# Patient Record
(demographics unavailable — no encounter records)

---

## 2025-01-17 NOTE — ASSESSMENT
[Diabetes Foot Care] : diabetes foot care [Long Term Vascular Complications] : long term vascular complications of diabetes [Carbohydrate Consistent Diet] : carbohydrate consistent diet [Importance of Diet and Exercise] : importance of diet and exercise to improve glycemic control, achieve weight loss and improve cardiovascular health [Retinopathy Screening] : Patient was referred to ophthalmology for retinopathy screening [FreeTextEntry1] : STABLE FROM DIABETES, AND HYPOGONADISM POINT OF VIEW, CONTINUES ON TESTOSTERONE INJECTIONS. patient gets free trulicity from [facility], and cheap xigduo from [facility].

## 2025-01-27 NOTE — HISTORY OF PRESENT ILLNESS
[FreeTextEntry1] :  No sob. No palpitations. .Chest pain resolved. .  Cath 1/31/22 no significant cad.. At times SALVADOR But better. He has asthma. He does not exercise. Seeing lilian. Told Gerd. on  meds. This is stable. Chest pain resolved with lorazapan. Back pain.t stable.> Not exercise.

## 2025-01-27 NOTE — REVIEW OF SYSTEMS
[Dyspnea on exertion] : dyspnea during exertion [Abdominal Pain] : abdominal pain [Joint Pain] : joint pain [Lower Back Pain] : lower back pain [Fever] : no fever [Chills] : no chills [Hearing Loss] : no hearing loss [Cough] : no cough [Urinary Frequency] : no change in urinary frequency [Rash] : no rash [Dizziness] : no dizziness [Confusion] : no confusion was observed [Easy Bleeding] : no tendency for easy bleeding

## 2025-01-27 NOTE — DISCUSSION/SUMMARY
[FreeTextEntry1] : The patient has dm over 20 years htn hyperlipidemia. He has nabil. He has calcium score 370.  He had past SALVADOR. , .  Blood by endocrine. . Neg SE 1/20. Poor exercise tolerance.    Told exercise.    In er 1/22 chest pain. Pain all day. He had salvador. Neg SE 1/14/22.. Cath 2/ 22 Cath 2/222 40-5-% lesions diagonal  OM. He does not exercise. [facility]He has stable gerd.  Saw Dr Romo second opinion. Better now on increased beta. Now Toprol xl 100. May increase Toprol to 125 a day . Resume. diet. Patient in ER 7/23 chest pain. No MI. Nuclear scan neg ischemia.  .  Told anxiety. He needs diet. Depression much better. Seeing psych.Long discussion again need exercise. Wife preset. Blood outside reviewed. Time spent 30 minutes. Reviewed how exercise. He needs weight loss.

## 2025-02-07 NOTE — DISCUSSION/SUMMARY
[FreeTextEntry1] : PUJA COMPLIANT AND BENEFITING WILL ORDER SUPPLIES SOB ON EXERTION/ ASTHMA/ RENEW MONTELUKAST/ SYMBICORT CT ANGIO NOTED PFT REVIEWED HOSPITAL RECOR REVIEWED

## 2025-02-07 NOTE — HISTORY OF PRESENT ILLNESS
[TextBox_4] : PUJA compliant and benefiting  Asthma On Singulair / Symbicort doing well sp recent CT angio

## 2025-02-25 NOTE — ASSESSMENT
[FreeTextEntry1] : Chronic BPH.  Continue finasteride 5 mg daily.  Recheck PSA.  Lower urinary tract symptoms well-controlled on oxybutynin ER 15 mg daily.  He will continue this.  ED.  Patient would like to switch to tadalafil 5 mg daily. [Urinary Symptom or Sign (788.99\R39.89)] : implantation

## 2025-02-25 NOTE — HISTORY OF PRESENT ILLNESS
[FreeTextEntry1] : 62-year-old with history of hemorrhagic BPH continue on finasteride 5 mg daily.  No recent PSA.  Lower urinary tract symptoms greatly improved after increasing oxybutynin ER from 10 to 15 mg.  Continuing on Cialis and vacuum device for ED.  On testosterone replacement therapy by endocrinology.

## 2025-05-30 NOTE — PHYSICAL EXAM
[Well Developed] : well developed [Well Nourished] : well nourished [No Acute Distress] : no acute distress [Normal Conjunctiva] : normal conjunctiva [Normal Venous Pressure] : normal venous pressure [No Carotid Bruit] : no carotid bruit [Normal S1, S2] : normal S1, S2 [No Murmur] : no murmur [Clear Lung Fields] : clear lung fields [Soft] : abdomen soft [Normal Gait] : normal gait [No Edema] : no edema [No Rash] : no rash [Moves all extremities] : moves all extremities [Alert and Oriented] : alert and oriented

## 2025-05-30 NOTE — DISCUSSION/SUMMARY
[FreeTextEntry1] : The patient has dm over 20 years htn hyperlipidemia. He has nabil. He has calcium score 370.  He had past SALVADOR. , .  Blood by endocrine. . Neg SE 1/20. Poor exercise tolerance.    Told exercise.    In er 1/22 chest pain. Pain all day. He had salvador. Neg SE 1/14/22.. Cath 2/ 22 Cath 2/222 40-5-% lesions diagonal  OM. He does not exercise. [facility]He has stable gerd.  Saw Dr Romo second opinion. Better now on increased beta. Now Toprol xl 100. May increase Toprol to 125 a day . Resume. diet. Patient in ER 7/23 chest pain. No MI. Nuclear scan neg ischemia.  .  Told anxiety. He needs diet. Depression much better. Seeing psych.Long discussion again need exercise. Wife preset. Blood outside reviewed. Time spent 30 minutes. Reviewed how exercise. He needs weight loss. He will need a lexiscan soon.EKG reviewed.

## 2025-05-30 NOTE — HISTORY OF PRESENT ILLNESS
[FreeTextEntry1] :  No sob. No palpitations. .Chest pain  if not take ranexa.  .  Cath 1/31/22 no significant cad.. At times SALVADOR But better. He has asthma. He does not exercise. Seeing mankimberley. Told Gerd. on  meds. This is stable.

## 2025-07-08 NOTE — CARDIOLOGY SUMMARY
[de-identified] : 07/08/2025: NSR, (-) significant ST-T changes. =======================================================

## 2025-07-08 NOTE — ASSESSMENT
[FreeTextEntry1] : CAMRYN OHARA is a pleasant 63-year-old male, with a PMHx significant for CAD (last cardiac cath in 2022 demonstrating mild to mod nonobstructive CAD), HTN, HLD, and DM, who presents today for cardiovascular evaluation.

## 2025-07-08 NOTE — HISTORY OF PRESENT ILLNESS
[FreeTextEntry1] : CAMRYN OHARA is a pleasant 63-year-old male, with a PMHx significant for CAD (last cardiac cath in 2022 demonstrating mild to mod nonobstructive CAD), HTN, HLD, and DM, who presents today for cardiovascular evaluation. Since his last cardiology visit, the patient underwent a nuclear stress test for evaluation of chest pain, which demonstrated normal perfusion. However, he reports a 1-week history of intermittent chest pain, described as chest pressure and tightness. Symptoms resolve after chewing 4 Aspirin. Patient is currently chest pain free with last episode being 4 days ago. Patient reports compliance with his medications. Denies bleeding or easy bruising.

## 2025-07-08 NOTE — DISCUSSION/SUMMARY
[EKG obtained to assist in diagnosis and management of assessed problem(s)] : EKG obtained to assist in diagnosis and management of assessed problem(s) [FreeTextEntry1] : 1. Chest Pain: - The pt has a hx of CAD and reports intermittent chest pain over the last week.  - NST did demonstrate normal perfusion. However, given ongoing chest discomfort, I am concerned the patient may have balance ischemia and significant CAD.  - Therefore, recommend cardiac catheterization to r/o obstructive CAD as etiology of symptoms. Pt is in agreement and came to his office appointment earlier because of his ongoing symptoms. All risks, benefits, indications, contraindications, and inferior alternative options of cardiac catheterization were explained to the patient and his wife in detail. They express understanding and elect for cardiac cath.  - I will arrange for the patient to undergo cardiac cath to evaluate above.  - Also, will obtain an echocardiogram to evaluate LV function and r/o valvular heart disease.  2. CAD: - Recommend continuing SAPT with Aspirin 81mg PO QD, Ranolazine 1000mg PO BID, and Atorvastatin 40mg PO QD.  - Other planned treatment includes dietary modification, an exercise regimen, and weight reduction.  3. HTN: - Recommend continuing Amlodipine 5mg PO QD, Losartan 100mg PO QD, and Metoprolol 100mg PO QD for HTN.  - Other planned treatments include an exercise regimen, weight loss, low sodium diet, and alcohol moderation.  4. HLD: - Recommend continuing Atorvastatin 40mg PO QD. - Other planned treatment includes diet modification, exercise, and weight loss.  5. DM: - There are no changes in medication management. - Patient advised to continue taking medications as prescribed, closely monitor blood sugar at home, follow a diabetic diet, exercise, lose weight, and follow up for continued monitoring. - Discussed importance of diet and exercise to improve glycemic control.  Instructed to follow up after testing is complete.  Plan was discussed with the patient.

## 2025-07-18 NOTE — ASSESSMENT
[Diabetes Foot Care] : diabetes foot care [Long Term Vascular Complications] : long term vascular complications of diabetes [Carbohydrate Consistent Diet] : carbohydrate consistent diet [Importance of Diet and Exercise] : importance of diet and exercise to improve glycemic control, achieve weight loss and improve cardiovascular health [Retinopathy Screening] : Patient was referred to ophthalmology for retinopathy screening [FreeTextEntry1] : type 2 diabetes under reasonable control, continues on testosterone injections, no changes